# Patient Record
Sex: FEMALE | Race: WHITE | URBAN - METROPOLITAN AREA
[De-identification: names, ages, dates, MRNs, and addresses within clinical notes are randomized per-mention and may not be internally consistent; named-entity substitution may affect disease eponyms.]

---

## 2017-11-16 ENCOUNTER — HOSPITAL ENCOUNTER (EMERGENCY)
Facility: CLINIC | Age: 47
Discharge: HOME | End: 2017-11-16
Attending: EMERGENCY MEDICINE | Admitting: EMERGENCY MEDICINE

## 2017-11-16 VITALS
HEIGHT: 69 IN | BODY MASS INDEX: 25.92 KG/M2 | HEART RATE: 101 BPM | SYSTOLIC BLOOD PRESSURE: 142 MMHG | WEIGHT: 175 LBS | RESPIRATION RATE: 18 BRPM | OXYGEN SATURATION: 98 % | TEMPERATURE: 98.1 F | DIASTOLIC BLOOD PRESSURE: 93 MMHG

## 2017-11-16 DIAGNOSIS — R55 NEAR SYNCOPE: ICD-10-CM

## 2017-11-16 LAB
ALBUMIN SERPL-MCNC: 3.9 G/DL (ref 3.4–5)
ALP SERPL-CCNC: 54 U/L (ref 40–150)
ALT SERPL W P-5'-P-CCNC: 18 U/L (ref 0–50)
ANION GAP SERPL CALCULATED.3IONS-SCNC: 9 MMOL/L (ref 3–14)
AST SERPL W P-5'-P-CCNC: 13 U/L (ref 0–45)
BASOPHILS # BLD AUTO: 0 10E9/L (ref 0–0.2)
BASOPHILS NFR BLD AUTO: 0.3 %
BILIRUB SERPL-MCNC: 0.3 MG/DL (ref 0.2–1.3)
BUN SERPL-MCNC: 11 MG/DL (ref 7–30)
CALCIUM SERPL-MCNC: 8.7 MG/DL (ref 8.5–10.1)
CHLORIDE SERPL-SCNC: 102 MMOL/L (ref 94–109)
CO2 SERPL-SCNC: 26 MMOL/L (ref 20–32)
CREAT SERPL-MCNC: 0.91 MG/DL (ref 0.52–1.04)
DIFFERENTIAL METHOD BLD: NORMAL
EOSINOPHIL # BLD AUTO: 0.1 10E9/L (ref 0–0.7)
EOSINOPHIL NFR BLD AUTO: 2.1 %
ERYTHROCYTE [DISTWIDTH] IN BLOOD BY AUTOMATED COUNT: 12 % (ref 10–15)
GFR SERPL CREATININE-BSD FRML MDRD: 66 ML/MIN/1.7M2
GLUCOSE SERPL-MCNC: 117 MG/DL (ref 70–99)
HCT VFR BLD AUTO: 40.1 % (ref 35–47)
HGB BLD-MCNC: 14.2 G/DL (ref 11.7–15.7)
IMM GRANULOCYTES # BLD: 0 10E9/L (ref 0–0.4)
IMM GRANULOCYTES NFR BLD: 0.2 %
INTERPRETATION ECG - MUSE: NORMAL
LIPASE SERPL-CCNC: 363 U/L (ref 73–393)
LYMPHOCYTES # BLD AUTO: 1.9 10E9/L (ref 0.8–5.3)
LYMPHOCYTES NFR BLD AUTO: 29.2 %
MCH RBC QN AUTO: 32.8 PG (ref 26.5–33)
MCHC RBC AUTO-ENTMCNC: 35.4 G/DL (ref 31.5–36.5)
MCV RBC AUTO: 93 FL (ref 78–100)
MONOCYTES # BLD AUTO: 0.7 10E9/L (ref 0–1.3)
MONOCYTES NFR BLD AUTO: 11.2 %
NEUTROPHILS # BLD AUTO: 3.7 10E9/L (ref 1.6–8.3)
NEUTROPHILS NFR BLD AUTO: 57 %
NRBC # BLD AUTO: 0 10*3/UL
NRBC BLD AUTO-RTO: 0 /100
PLATELET # BLD AUTO: 253 10E9/L (ref 150–450)
POTASSIUM SERPL-SCNC: 3.4 MMOL/L (ref 3.4–5.3)
PROT SERPL-MCNC: 7.7 G/DL (ref 6.8–8.8)
RBC # BLD AUTO: 4.33 10E12/L (ref 3.8–5.2)
SODIUM SERPL-SCNC: 137 MMOL/L (ref 133–144)
TROPONIN I SERPL-MCNC: <0.015 UG/L (ref 0–0.04)
WBC # BLD AUTO: 6.5 10E9/L (ref 4–11)

## 2017-11-16 PROCEDURE — 84484 ASSAY OF TROPONIN QUANT: CPT | Performed by: EMERGENCY MEDICINE

## 2017-11-16 PROCEDURE — 93005 ELECTROCARDIOGRAM TRACING: CPT

## 2017-11-16 PROCEDURE — 99284 EMERGENCY DEPT VISIT MOD MDM: CPT

## 2017-11-16 PROCEDURE — 80053 COMPREHEN METABOLIC PANEL: CPT | Performed by: EMERGENCY MEDICINE

## 2017-11-16 PROCEDURE — 83690 ASSAY OF LIPASE: CPT | Performed by: EMERGENCY MEDICINE

## 2017-11-16 PROCEDURE — 85025 COMPLETE CBC W/AUTO DIFF WBC: CPT | Performed by: EMERGENCY MEDICINE

## 2017-11-16 ASSESSMENT — ENCOUNTER SYMPTOMS
DIZZINESS: 1
BLOOD IN STOOL: 1
NUMBNESS: 1
NAUSEA: 1
LIGHT-HEADEDNESS: 1
FATIGUE: 1
ABDOMINAL PAIN: 1

## 2017-11-16 NOTE — ED PROVIDER NOTES
History     Chief Complaint:  Rectal bleeding    HPI   Katelyn Mauricio is a 47 year old female who presents with rectal bleeding. The patient reports that she had recently broken her wrist and had been taking a large amount of ibuprofen. Two days ago she went to her doctor for abdominal pain and was told that she likely had a gastric ulcer and to watch for bloody stool. She was started on Prilosec.Today the patient has been experienced abdominal pain, left leg numbness, nausea, and light headedness. This light headedness has not been worse with standing and is not associated with any particular activity. Her most concerning symptom today has been dark black and tarry stool. She denies having had any recent dietary changes but does note having started taking Prilosec and pepto bismol. She denies having had any history of bleeding problems or excessive bleeding.    Allergies:  No Known Drug Allergies      Medications:    Omeprazole    Past Medical History:    The patient denies any relevant past medical history.     Past Surgical History:    Appendectomy  ENT surgery    Family History:    The patient denies any relevant family medical history.     Social History:  Smoking Status: No  Smokeless Tobacco: No  Alcohol Use: Yes   Marital Status:   [2]    Review of Systems   Constitutional: Positive for fatigue.   Gastrointestinal: Positive for abdominal pain, blood in stool and nausea.   Neurological: Positive for dizziness, light-headedness and numbness.   All other systems reviewed and are negative.    Physical Exam   Vitals:  Patient Vitals for the past 24 hrs:   BP Temp Temp src Pulse Resp SpO2 Height Weight   11/16/17 0345 (!) 142/93 - - - - 98 % - -   11/16/17 0330 (!) 144/96 - - - - 97 % - -   11/16/17 0320 137/90 - - - - 97 % - -   11/16/17 0246 - - - - - 99 % - -   11/16/17 0245 (!) 154/105 - - - - - - -   11/16/17 0230 (!) 173/107 - - - - 100 % - -   11/16/17 0210 (!) 182/113 98.1  F (36.7  C) Oral 101 18  "99 % 1.753 m (5' 9\") 79.4 kg (175 lb)      Physical Exam  Physical Exam   Nursing note and vitals reviewed.  General: Oriented to person, place, and time. Appears well-developed and well-nourished.   Head: No signs of trauma.   Mouth/Throat: Oropharynx is clear and moist.   Eyes: Conjunctivae are normal. Pupils are equal, round, and reactive to light.   Neck: Normal range of motion. No nuchal rigidity.   Cardiovascular: Normal rate and regular rhythm.    Respiratory: Effort normal and breath sounds normal. No respiratory distress.   Abdominal: Soft. There is no tenderness. There is no guarding.   Musculoskeletal: Normal range of motion. no edema.   Neurological: The patient is alert and oriented to person, place, and time.  PERRLA, EOMI, visual fields intact, strength in upper/lower extremities normal and symmetrical.   Sensation normal. Speech normal  GCS eye subscore is 4. GCS verbal subscore is 5. GCS motor subscore is 6.   Skin: Skin is warm and dry. No rash noted.   Psychiatric: normal mood and affect. behavior is normal.    Emergency Department Course     ECG:  ECG taken at 0315, ECG read at 0317  Normal sinus rhythm. possible left atrial enlargement. Borderline ECG. No significant change noted compared to 12/10/07  Rate 80 bpm. AL interval 132. QRS duration 86. QT/QTc 398/459. P-R-T axes 73, 57, 47.     Laboratory:  Laboratory findings were communicated with the patient who voiced understanding of the findings.  CBC: AWNL (WBC 6.5, HGB 14.2, )  CMP: Glucose: 117(H), o/w WNL (Creatinine 0.91)   Lipase: 363  Troponin (Collected 0240): <0.015     Emergency Department Course:  Nursing notes and vitals reviewed.  I performed an exam of the patient as documented above.   IV was inserted and blood was drawn for laboratory testing, results above.    0342 I rechecked with the patient    I discussed the treatment plan with the patient. They expressed understanding of this plan and consented to discharge. They " will be discharged home with instructions for care and follow up. In addition, the patient will return to the emergency department if their symptoms persist, worsen, if new symptoms arise or if there is any concern.  All questions were answered.     I personally reviewed the laboratory results with the patient and answered all related questions prior to discharge.    Impression & Plan      Medical Decision Making:  Katelyn Mauricio is a 47 year old female who presents to the emergency department today with symptoms consistent with near syncope. This is in the setting of epigastric pain and possible melena tonight, however she has been using pepto bismol which is also a likely cause of her dark stools. The patient is unable to give us a sample and is refusing rectal exam to obtain the sample to test stool. Patient's hemoglobin is adequate, platelets are appropriate, and her white count is normal. No liver inflammation, no signs of pancreatitis, no indication of acute coronary syndrome, no signs of a cardiac arrhythmia, and no signs of systemic infection. The patient remained hemodynamically stable here in the emergency department and is asymptomatic. I think it is safe for her to follow up as an outpatient for further evaluation and treatment.     Diagnosis:    ICD-10-CM    1. Near syncope R55        Disposition:   Discharged    CMS Diagnoses: None     Scribe Disclosure:  Mitchell ACEVES, am serving as a scribe at 2:51 AM on 11/16/2017 to document services personally performed by Aadm Alcocer MD, based on my observations and the provider's statements to me.    EMERGENCY DEPARTMENT       Adam Alcocer MD  11/16/17 0633

## 2017-11-16 NOTE — ED AVS SNAPSHOT
Emergency Department    [address] [facility] [place] [address]    Phone:  [phone]    Fax:  [phone]                                       Katelyn Mauricio   MRN: [id number]    Department:   Emergency Department   Date of Visit:  11/16/2017           Patient Information     Date Of Birth          1970        Your diagnoses for this visit were:     Near syncope        You were seen by Adam Alcocer MD.      Follow-up Information     Follow up with  Emergency Department In 1 day.    Specialty:  EMERGENCY MEDICINE    Why:  If symptoms worsen    Contact information:    [address] [facility] [id number] [phone]        Follow up with No Ref-Primary, Physician. Call in 2 days.    Contact information:    NO REF-PRIMARY PHYSICIAN          Discharge Instructions         Near-Fainting: Uncertain Cause  Fainting (syncope) is a temporary loss of consciousness (passing out). This happens when blood flow to the brain is reduced. Near-fainting (near-syncope) is like fainting, but you do not fully pass out. Instead, you feel like you are going to pass out, but do not actually lose consciousness.  Signs and symptoms  The following are symptoms of near-fainting:    Feeling lightheaded or like you are going to faint    Weak pulse    Nausea    Sweating    Blurred vision or feeling like your vision is fading    Palpitations    Chest pain    Hard time breathing    Feeling cool and clammy  Causes  This happens when your blood pressure suddenly drops, and not enough blood flows to your brain.  Common minor causes include:    Sudden emotional stress such as fear, pain, panic, or the sight of blood    Straining or overexertion, such as straining while using the toilet, coughing, or sneezing    Standing up too quickly, or standing up for too long a time  More serious causes include:    Very slow, fast, or irregular heart rate (arrhythmia)    Dehydration    Significant blood  loss    Medicines, or a recent change in medicines. Medicines that can cause fainting include blood pressure or heart medicines.    Heart attack    Heart valve problems  Remember, even minor causes can become serious if you fall and injure yourself, or are driving. You may need more tests. It is very important that you follow up with your doctor as advised.  Home care  The following guidelines will help you care for yourself at home:    Rest today. Resume your normal activities as soon as you are feeling back to normal.    If you become lightheaded or dizzy, lie down right away or sit with your head between your knees.    Drink plenty of fluids and don't skip meals.  Because the exact cause of your near fainting spell is not known, another spell could occur without warning. To stay safe, do not drive a car or use dangerous equipment. Do not take a bath alone. Use a shower instead. Do not swim alone. You can resume these activities when your healthcare provider says that you are no longer in danger of having a near-fainting spell.  Follow-up care  Follow up with your healthcare provider, or as advised.  Call 911  Call 911 if any of the following occur:    Another near-fainting or full fainting spell occurs, and it is not explained by the common causes listed above    Chest, arm, neck, jaw, back or abdominal pain    Shortness of breath    Weakness, tingling, or numbness in one side of the face, or in one arm or leg    Slurred speech, confusion, trouble walking or seeing    Seizure  When to seek medical advice  Call your healthcare provider right away if any of these occur:    Changes in your medicines    Occasional mild lightheadedness, especially when standing up too quickly or straining  Date Last Reviewed: 10/1/2016    3044-7176 [employer]. [address] [facility], [place] [address]. All rights reserved. This information is not intended as a substitute for professional medical care. Always follow your  healthcare professional's instructions.          24 Hour Appointment Hotline       To make an appointment at any [facility], call [phone] ([phone]). If you don't have a family doctor or clinic, we will help you find one. [facility] are conveniently located to serve the needs of you and your family.             Review of your medicines      Our records show that you are taking the medicines listed below. If these are incorrect, please call your family doctor or clinic.        Dose / Directions Last dose taken    PRILOSEC PO        Refills:  0                Procedures and tests performed during your visit     CBC with platelets + differential    Comprehensive metabolic panel    EKG 12 lead    Lipase    Troponin I      Orders Needing Specimen Collection     Ordered          11/16/17 0251  Occult blood stool - STAT, Prio: STAT, Needs to be Collected     Scheduled Task Status   11/16/17 0251 Collect Occult blood stool Open   Order Class:  PCU Collect                  Pending Results     No orders found from 11/14/2017 to 11/17/2017.            Pending Culture Results     No orders found from 11/14/2017 to 11/17/2017.            Pending Results Instructions     If you had any lab results that were not finalized at the time of your Discharge, you can call the ED Lab Result RN at [phone]. You will be contacted by this team for any positive Lab results or changes in treatment. The nurses are available 7 days a week from 10A to 6:30P.  You can leave a message 24 hours per day and they will return your call.        Test Results From Your Hospital Stay        11/16/2017  2:57 AM      Component Results     Component Value Ref Range & Units Status    WBC 6.5 4.0 - 11.0 10e9/L Final    RBC Count 4.33 3.8 - 5.2 10e12/L Final    Hemoglobin 14.2 11.7 - 15.7 g/dL Final    Hematocrit 40.1 35.0 - 47.0 % Final    MCV 93 78 - 100 fl Final    MCH 32.8 26.5 - 33.0 pg Final    MCHC 35.4 31.5 - 36.5 g/dL Final     RDW 12.0 10.0 - 15.0 % Final    Platelet Count 253 150 - 450 10e9/L Final    Diff Method Automated Method  Final    % Neutrophils 57.0 % Final    % Lymphocytes 29.2 % Final    % Monocytes 11.2 % Final    % Eosinophils 2.1 % Final    % Basophils 0.3 % Final    % Immature Granulocytes 0.2 % Final    Nucleated RBCs 0 0 /100 Final    Absolute Neutrophil 3.7 1.6 - 8.3 10e9/L Final    Absolute Lymphocytes 1.9 0.8 - 5.3 10e9/L Final    Absolute Monocytes 0.7 0.0 - 1.3 10e9/L Final    Absolute Eosinophils 0.1 0.0 - 0.7 10e9/L Final    Absolute Basophils 0.0 0.0 - 0.2 10e9/L Final    Abs Immature Granulocytes 0.0 0 - 0.4 10e9/L Final    Absolute Nucleated RBC 0.0  Final         11/16/2017  3:18 AM      Component Results     Component Value Ref Range & Units Status    Sodium 137 133 - 144 mmol/L Final    Potassium 3.4 3.4 - 5.3 mmol/L Final    Chloride 102 94 - 109 mmol/L Final    Carbon Dioxide 26 20 - 32 mmol/L Final    Anion Gap 9 3 - 14 mmol/L Final    Glucose 117 (H) 70 - 99 mg/dL Final    Urea Nitrogen 11 7 - 30 mg/dL Final    Creatinine 0.91 0.52 - 1.04 mg/dL Final    GFR Estimate 66 >60 mL/min/1.7m2 Final    Non  GFR Calc    GFR Estimate If Black 80 >60 mL/min/1.7m2 Final    African American GFR Calc    Calcium 8.7 8.5 - 10.1 mg/dL Final    Bilirubin Total 0.3 0.2 - 1.3 mg/dL Final    Albumin 3.9 3.4 - 5.0 g/dL Final    Protein Total 7.7 6.8 - 8.8 g/dL Final    Alkaline Phosphatase 54 40 - 150 U/L Final    ALT 18 0 - 50 U/L Final    AST 13 0 - 45 U/L Final         11/16/2017  3:18 AM      Component Results     Component Value Ref Range & Units Status    Lipase 363 73 - 393 U/L Final         11/16/2017  3:26 AM      Component Results     Component Value Ref Range & Units Status    Troponin I ES <0.015 0.000 - 0.045 ug/L Final    The 99th percentile for upper reference range is 0.045 ug/L.  Troponin values   in the range of 0.045 - 0.120 ug/L may be associated with risks of adverse   clinical  events.                  Clinical Quality Measure: Blood Pressure Screening     Your blood pressure was checked while you were in the emergency department today. The last reading we obtained was  BP: (!) 144/96 . Please read the guidelines below about what these numbers mean and what you should do about them.  If your systolic blood pressure (the top number) is less than 120 and your diastolic blood pressure (the bottom number) is less than 80, then your blood pressure is normal. There is nothing more that you need to do about it.  If your systolic blood pressure (the top number) is 120-139 or your diastolic blood pressure (the bottom number) is 80-89, your blood pressure may be higher than it should be. You should have your blood pressure rechecked within a year by a primary care provider.  If your systolic blood pressure (the top number) is 140 or greater or your diastolic blood pressure (the bottom number) is 90 or greater, you may have high blood pressure. High blood pressure is treatable, but if left untreated over time it can put you at risk for heart attack, stroke, or kidney failure. You should have your blood pressure rechecked by a primary care provider within the next 4 weeks.  If your provider in the emergency department today gave you specific instructions to follow-up with your doctor or provider even sooner than that, you should follow that instruction and not wait for up to 4 weeks for your follow-up visit.        Thank you for choosing [facility]       Thank you for choosing [facility] for your care. Our goal is always to provide you with excellent care. Hearing back from our patients is one way we can continue to improve our services. Please take a few minutes to complete the written survey that you may receive in the mail after you visit with us. Thank you!        [employer] Information     [employer] lets you send messages to your doctor, view your test results, renew your prescriptions, schedule  "appointments and more. To sign up, go to [email or web address] . Click on \"Log in\" on the left side of the screen, which will take you to the Welcome page. Then click on \"Sign up Now\" on the right side of the page.     You will be asked to enter the access code listed below, as well as some personal information. Please follow the directions to create your username and password.     Your access code is: [email or web address]  Expires: 2018  3:54 AM     Your access code will  in 90 days. If you need help or a new code, please call your [facility] clinic or [phone].        Care EveryWhere ID     This is your Care EveryWhere ID. This could be used by other organizations to access your [facility] medical records  [id number]        Equal Access to Services     DEBBY LOFTON : Samira Fuentes, florencio farrell, denny robbins, joselito smith . So [facility] [phone].    ATENCIÓN: Si habla español, tiene a castellon disposición servicios gratuitos de asistencia lingüística. Llame al [phone].    We comply with applicable federal civil rights laws and [place] laws. We do not discriminate on the basis of race, color, national origin, age, disability, sex, sexual orientation, or gender identity.            After Visit Summary       This is your record. Keep this with you and show to your community pharmacist(s) and doctor(s) at your next visit.                  "

## 2017-11-16 NOTE — DISCHARGE INSTRUCTIONS
Near-Fainting: Uncertain Cause  Fainting (syncope) is a temporary loss of consciousness (passing out). This happens when blood flow to the brain is reduced. Near-fainting (near-syncope) is like fainting, but you do not fully pass out. Instead, you feel like you are going to pass out, but do not actually lose consciousness.  Signs and symptoms  The following are symptoms of near-fainting:    Feeling lightheaded or like you are going to faint    Weak pulse    Nausea    Sweating    Blurred vision or feeling like your vision is fading    Palpitations    Chest pain    Hard time breathing    Feeling cool and clammy  Causes  This happens when your blood pressure suddenly drops, and not enough blood flows to your brain.  Common minor causes include:    Sudden emotional stress such as fear, pain, panic, or the sight of blood    Straining or overexertion, such as straining while using the toilet, coughing, or sneezing    Standing up too quickly, or standing up for too long a time  More serious causes include:    Very slow, fast, or irregular heart rate (arrhythmia)    Dehydration    Significant blood loss    Medicines, or a recent change in medicines. Medicines that can cause fainting include blood pressure or heart medicines.    Heart attack    Heart valve problems  Remember, even minor causes can become serious if you fall and injure yourself, or are driving. You may need more tests. It is very important that you follow up with your doctor as advised.  Home care  The following guidelines will help you care for yourself at home:    Rest today. Resume your normal activities as soon as you are feeling back to normal.    If you become lightheaded or dizzy, lie down right away or sit with your head between your knees.    Drink plenty of fluids and don't skip meals.  Because the exact cause of your near fainting spell is not known, another spell could occur without warning. To stay safe, do not drive a car or use dangerous  equipment. Do not take a bath alone. Use a shower instead. Do not swim alone. You can resume these activities when your healthcare provider says that you are no longer in danger of having a near-fainting spell.  Follow-up care  Follow up with your healthcare provider, or as advised.  Call 911  Call 911 if any of the following occur:    Another near-fainting or full fainting spell occurs, and it is not explained by the common causes listed above    Chest, arm, neck, jaw, back or abdominal pain    Shortness of breath    Weakness, tingling, or numbness in one side of the face, or in one arm or leg    Slurred speech, confusion, trouble walking or seeing    Seizure  When to seek medical advice  Call your healthcare provider right away if any of these occur:    Changes in your medicines    Occasional mild lightheadedness, especially when standing up too quickly or straining  Date Last Reviewed: 10/1/2016    7326-0789 [employer]. [address] [place] [address], [place] [address]. All rights reserved. This information is not intended as a substitute for professional medical care. Always follow your healthcare professional's instructions.

## 2017-11-16 NOTE — ED AVS SNAPSHOT
Emergency Department    [address] [place] [address]    Phone:  [phone]    Fax:  [phone]                                       Katelyn Mauricio   MRN: [id number]    Department:   Emergency Department   Date of Visit:  11/16/2017           After Visit Summary Signature Page     I have received my discharge instructions, and my questions have been answered. I have discussed any challenges I see with this plan with the nurse or doctor.    ..........................................................................................................................................  Patient/Patient Representative Signature      ..........................................................................................................................................  Patient Representative Print Name and Relationship to Patient    ..................................................               ................................................  Date                                            Time    ..........................................................................................................................................  Reviewed by Signature/Title    ...................................................              ..............................................  Date                                                            Time

## 2017-11-21 ENCOUNTER — APPOINTMENT (OUTPATIENT)
Dept: GENERAL RADIOLOGY | Facility: CLINIC | Age: 47
End: 2017-11-21
Attending: EMERGENCY MEDICINE
Payer: COMMERCIAL

## 2017-11-21 ENCOUNTER — HOSPITAL ENCOUNTER (EMERGENCY)
Facility: CLINIC | Age: 47
Discharge: HOME | End: 2017-11-21
Attending: EMERGENCY MEDICINE | Admitting: EMERGENCY MEDICINE
Payer: COMMERCIAL

## 2017-11-21 VITALS
OXYGEN SATURATION: 99 % | TEMPERATURE: 97.9 F | WEIGHT: 167 LBS | HEIGHT: 69 IN | RESPIRATION RATE: 18 BRPM | BODY MASS INDEX: 24.73 KG/M2 | SYSTOLIC BLOOD PRESSURE: 148 MMHG | HEART RATE: 89 BPM | DIASTOLIC BLOOD PRESSURE: 90 MMHG

## 2017-11-21 DIAGNOSIS — R10.13 ABDOMINAL PAIN, EPIGASTRIC: ICD-10-CM

## 2017-11-21 DIAGNOSIS — K92.1 BLACK STOOLS: ICD-10-CM

## 2017-11-21 LAB
ALBUMIN SERPL-MCNC: 3.7 G/DL (ref 3.4–5)
ALP SERPL-CCNC: 47 U/L (ref 40–150)
ALT SERPL W P-5'-P-CCNC: 23 U/L (ref 0–50)
ANION GAP SERPL CALCULATED.3IONS-SCNC: 5 MMOL/L (ref 3–14)
AST SERPL W P-5'-P-CCNC: 15 U/L (ref 0–45)
BASOPHILS # BLD AUTO: 0 10E9/L (ref 0–0.2)
BASOPHILS NFR BLD AUTO: 0.3 %
BILIRUB SERPL-MCNC: 0.3 MG/DL (ref 0.2–1.3)
BUN SERPL-MCNC: 6 MG/DL (ref 7–30)
CALCIUM SERPL-MCNC: 8.6 MG/DL (ref 8.5–10.1)
CHLORIDE SERPL-SCNC: 105 MMOL/L (ref 94–109)
CO2 SERPL-SCNC: 27 MMOL/L (ref 20–32)
CREAT SERPL-MCNC: 0.74 MG/DL (ref 0.52–1.04)
DIFFERENTIAL METHOD BLD: NORMAL
EOSINOPHIL # BLD AUTO: 0.1 10E9/L (ref 0–0.7)
EOSINOPHIL NFR BLD AUTO: 1.9 %
ERYTHROCYTE [DISTWIDTH] IN BLOOD BY AUTOMATED COUNT: 12.1 % (ref 10–15)
GFR SERPL CREATININE-BSD FRML MDRD: 84 ML/MIN/1.7M2
GLUCOSE SERPL-MCNC: 126 MG/DL (ref 70–99)
HCT VFR BLD AUTO: 37 % (ref 35–47)
HGB BLD-MCNC: 12.8 G/DL (ref 11.7–15.7)
IMM GRANULOCYTES # BLD: 0 10E9/L (ref 0–0.4)
IMM GRANULOCYTES NFR BLD: 0.1 %
LIPASE SERPL-CCNC: 266 U/L (ref 73–393)
LYMPHOCYTES # BLD AUTO: 2.1 10E9/L (ref 0.8–5.3)
LYMPHOCYTES NFR BLD AUTO: 28 %
MCH RBC QN AUTO: 32.2 PG (ref 26.5–33)
MCHC RBC AUTO-ENTMCNC: 34.6 G/DL (ref 31.5–36.5)
MCV RBC AUTO: 93 FL (ref 78–100)
MONOCYTES # BLD AUTO: 0.6 10E9/L (ref 0–1.3)
MONOCYTES NFR BLD AUTO: 8.4 %
NEUTROPHILS # BLD AUTO: 4.6 10E9/L (ref 1.6–8.3)
NEUTROPHILS NFR BLD AUTO: 61.3 %
NRBC # BLD AUTO: 0 10*3/UL
NRBC BLD AUTO-RTO: 0 /100
PLATELET # BLD AUTO: 248 10E9/L (ref 150–450)
POTASSIUM SERPL-SCNC: 3.8 MMOL/L (ref 3.4–5.3)
PROT SERPL-MCNC: 6.8 G/DL (ref 6.8–8.8)
RBC # BLD AUTO: 3.98 10E12/L (ref 3.8–5.2)
SODIUM SERPL-SCNC: 137 MMOL/L (ref 133–144)
WBC # BLD AUTO: 7.5 10E9/L (ref 4–11)

## 2017-11-21 PROCEDURE — 96360 HYDRATION IV INFUSION INIT: CPT

## 2017-11-21 PROCEDURE — 25000128 H RX IP 250 OP 636: Performed by: EMERGENCY MEDICINE

## 2017-11-21 PROCEDURE — 74020 XR ABDOMEN 2 VW: CPT

## 2017-11-21 PROCEDURE — 80053 COMPREHEN METABOLIC PANEL: CPT | Performed by: EMERGENCY MEDICINE

## 2017-11-21 PROCEDURE — 83690 ASSAY OF LIPASE: CPT | Performed by: EMERGENCY MEDICINE

## 2017-11-21 PROCEDURE — 85025 COMPLETE CBC W/AUTO DIFF WBC: CPT | Performed by: EMERGENCY MEDICINE

## 2017-11-21 PROCEDURE — 99284 EMERGENCY DEPT VISIT MOD MDM: CPT | Mod: 25

## 2017-11-21 RX ORDER — SUCRALFATE 1 G/1
1 TABLET ORAL 4 TIMES DAILY
Qty: 120 TABLET | Refills: 0 | Status: SHIPPED | OUTPATIENT
Start: 2017-11-21

## 2017-11-21 RX ORDER — PANTOPRAZOLE SODIUM 40 MG/1
40 TABLET, DELAYED RELEASE ORAL DAILY
Qty: 30 TABLET | Refills: 1 | Status: SHIPPED | OUTPATIENT
Start: 2017-11-21

## 2017-11-21 RX ORDER — SODIUM CHLORIDE 9 MG/ML
1000 INJECTION, SOLUTION INTRAVENOUS CONTINUOUS
Status: DISCONTINUED | OUTPATIENT
Start: 2017-11-21 | End: 2017-11-21 | Stop reason: HOSPADM

## 2017-11-21 RX ADMIN — SODIUM CHLORIDE 1000 ML: 9 INJECTION, SOLUTION INTRAVENOUS at 15:19

## 2017-11-21 ASSESSMENT — ENCOUNTER SYMPTOMS
ABDOMINAL PAIN: 1
LIGHT-HEADEDNESS: 1
BLOOD IN STOOL: 1
NAUSEA: 1
FEVER: 0

## 2017-11-21 NOTE — ED AVS SNAPSHOT
Emergency Department    [address] [facility] [address]    Phone:  [phone]    Fax:  [phone]                                       Katelyn Mauricio   MRN: [id number]    Department:   Emergency Department   Date of Visit:  11/21/2017           Patient Information     Date Of Birth          1970        Your diagnoses for this visit were:     Abdominal pain, epigastric     Black stools        You were seen by Mildred Guerin MD.      Follow-up Information     Follow up with Clinic, [facility]. Schedule an appointment as soon as possible for a visit in 3 days.    Contact information:    [address], [address]  [facility] [address]  [phone]          Discharge Instructions       *Get plenty of rest and avoid strenuous activities.  *Take medications as prescribed.  Avoid NSAIDs. Zofran for nausea.  Carafate and omeprazole.   *Follow-up with your doctor for a recheck within 12-36 hours.  Follow-up for endoscopy - a referral has been made for you and you should receive a call.  *Return to the ER if you develop fever, worsening pain, pain that moves to the right lower abdomen, faint or feel like you will faint or become worse in any way.      Gastritis or Ulcer (No Antibiotic Treatment)    Gastritis is irritation and inflammation of the stomach lining. This means the lining is red and swollen. An ulcer is an open sore in the lining of the stomach. It may also occur in the duodenum (first part of the small intestine). The causes and symptoms of gastritis and ulcers are very similar.  Causes and risk factors for both problems can include:    Long-term use of nonsteroidal anti-inflammatory drugs (NSAIDs), such as aspirin and ibuprofen    H. pylori bacteria infection    Tobacco use    Alcohol use  Symptoms for both problems can include:    Dull or burning pain in the upper part of the belly    Loss of appetite    Heartburn or upset stomach    Frequent burping    Bloated  feeling    Nausea with or without vomiting  You likely had an evaluation to help determine the exact cause and extent of your problem. This may have included a health history, exam, and certain tests.  Results showed that your problem is not due to H. pylori infection. For this reason, no antibiotics are needed as part of your treatment.  Whether your problem is found to be gastritis or an ulcer, you will still need to take other medicines, however. You will also need to follow instructions to help reduce stomach irritation so your stomach can heal.   Home care    Take any medicines you re prescribed exactly as directed. Common medicines used to treat gastritis include:    Antacids. These help neutralize the normal acids in your stomach.    Proton pump inhibitors. These block your stomach from making any acid.    H2 blockers.These reduce the amount of acid your stomach makes.    Bismuth subsalicylate. This helps protect the lining of your stomach from acid.    Avoid taking any NSAIDS during your treatment. If you take NSAID to help treat other health problems, tell your healthcare provider. He or she may need to adjust your medicine plan or change the dosage.    Don t use tobacco. Also don t drink alcohol. These products can increase the amount of acid your stomach makes. This can delay healing. It can also worsen symptoms.  Follow-up care  Follow up with your healthcare provider, or as advised. In some cases, further testing may be needed.  When to seek medical advice  Call your healthcare provider right away if any of these occur:    Fever of 100.4 F (38 C) or higher or as directed by your provider    Stomach pain that worsens or moves to the lower right part of abdomen    Extreme fatigue    Weakness or dizziness    Continued weight loss    Frequent vomiting, blood in your vomit, or coffee ground-like substance in your vomit    Black, tarry, or bloody stools  Call 911  Call 911 right away if any of these  occur:    Chest pain appears or worsens, or spreads to the back, neck, shoulder, or arm    Unusually fast heart rate    Trouble breathing or swallowing    Confusion    Extreme drowsiness or trouble waking up    Fainting    Large amounts of blood present in vomit or stool  Date Last Reviewed: 6/16/2015 2000-2017 [employer]. [address] [place] [id number]. All rights reserved. This information is not intended as a substitute for professional medical care. Always follow your healthcare professional's instructions.        Discharge Instructions  Gastrointestinal Bleeding Not Requiring Admission    You have been seen today because of GI (gastrointestinal) bleeding, bleeding somewhere along your digestive tract.  Most common symptoms are blood in the stool or when you have a bowel movement.  The most common causes of minor GI bleeding are ulcers and hemorrhoids.  Other conditions that cause bleeding include abnormal blood vessels in your GI tract, diverticulosis, inflammatory bowel disease, and cancer.  Fortunately, most of the causes of such bleeding are not life-threatening.      It does not appear that your bleeding is serious enough to require admission to the hospital, but it is very important for you to follow up as instructed.    At your follow up, your regular doctor or GI specialist may order further testing such as:    Blood and stool tests.    Endoscopy and/or colonoscopy, where a tube with a camera is used to look at your digestive tract.    Other very specialized tests depending on your symptoms.    Return to the Emergency Department right away if you:    Develop fever with an oral temperature above 101 F.    Vomit blood or something that looks like coffee grounds.    Have a bowel movement that looks like tar or has a large amount of blood in it.    Feel weak, light-headed, or faint.    Have a racing heartbeat.    Have abdominal pain that is new or increasing.    Have new symptoms  that worry you.    What can I do to help myself?    Take any acid reducing medication prescribed by your doctor.    Avoid over the counter medications such as aspirin and Advil , Nuprin  (ibuprofen) that can thin your blood or irritate your stomach, making ulcers worse.  If you were given a prescription for medicine here today, be sure to read all of the information (including the package insert) that comes with your prescription.  This will include important information about the medicine, its side effects, and any warnings that you need to know about.  The pharmacist who fills the prescription can provide more information and answer questions you may have about the medicine.  If you have questions or concerns that the pharmacist cannot address, please call or return to the Emergency Department.     Opioid Medication Information    Pain medications are among the most commonly prescribed medicines, so we are including this information for all our patients. If you did not receive pain medication or get a prescription for pain medicine, you can ignore it.     You may have been given a prescription for an opioid (narcotic) pain medicine and/or have received a pain medicine while here in the Emergency Department. These medicines can make you drowsy or impaired. You must not drive, operate dangerous equipment, or engage in any other dangerous activities while taking these medications. If you drive while taking these medications, you could be arrested for DUI, or driving under the influence. Do not drink any alcohol while you are taking these medications.     Opioid pain medications can cause addiction. If you have a history of chemical dependency of any type, you are at a higher risk of becoming addicted to pain medications.  Only take these prescribed medications to treat your pain when all other options have been tried. Take it for as short a time and as few doses as possible. Store your pain pills in a secure place,  as they are frequently stolen and provide a dangerous opportunity for children or visitors in your house to start abusing these powerful medications. We will not replace any lost or stolen medicine.  As soon as your pain is better, you should flush all your remaining medication.     Many prescription pain medications contain Tylenol  (acetaminophen), including Vicodin , Tylenol #3 , Norco , Lortab , and Percocet .  You should not take any extra pills of Tylenol  if you are using these prescription medications or you can get very sick.  Do not ever take more than 3000 mg of acetaminophen in any 24 hour period.    All opioids tend to cause constipation. Drink plenty of water and eat foods that have a lot of fiber, such as fruits, vegetables, prune juice, apple juice and high fiber cereal.  Take a laxative if you don t move your bowels at least every other day. Miralax , Milk of Magnesia, Colace , or Senna  can be used to keep you regular.      Remember that you can always come back to the Emergency Department if you are not able to see your regular doctor in the amount of time listed above, if you get any new symptoms, or if there is anything that worries you.      24 Hour Appointment Hotline       To make an appointment at any [facility] clinic, call [phone] ([phone]). If you don't have a family doctor or clinic, we will help you find one. [facility] clinics are conveniently located to serve the needs of you and your family.          ED Discharge Orders     GASTROENTEROLOGY ADULT REF PROCEDURE ONLY       Last Lab Result: Creatinine (mg/dL)       Date                     Value                 11/21/2017               0.74             ----------  Body mass index is 24.66 kg/(m^2).     Needed:  No  Language:  English    Patient will be contacted to schedule procedure.     Please be aware that coverage of these services is subject to the terms and limitations of your health insurance plan.  Call  member services at your health plan with any benefit or coverage questions.  Any procedures must be performed at a [facility] facility OR coordinated by your clinic's referral office.    Please bring the following with you to your appointment:    (1) Any X-Rays, CTs or MRIs which have been performed.  Contact the facility where they were done to arrange for  prior to your scheduled appointment.    (2) List of current medications   (3) This referral request   (4) Any documents/labs given to you for this referral                     Review of your medicines      START taking        Dose / Directions Last dose taken    pantoprazole 40 MG EC tablet   Commonly known as:  PROTONIX   Dose:  40 mg   Quantity:  30 tablet        Take 1 tablet (40 mg) by mouth daily Take 30-60 minutes before a meal.   Refills:  1        sucralfate 1 GM tablet   Commonly known as:  CARAFATE   Dose:  1 g   Quantity:  120 tablet        Take 1 tablet (1 g) by mouth 4 times daily   Refills:  0          Our records show that you are taking the medicines listed below. If these are incorrect, please call your family doctor or clinic.        Dose / Directions Last dose taken    PRILOSEC PO        Refills:  0                Prescriptions were sent or printed at these locations (2 Prescriptions)                   Other Prescriptions                Printed at Department/Unit printer (2 of 2)         pantoprazole (PROTONIX) 40 MG EC tablet               sucralfate (CARAFATE) 1 GM tablet                Procedures and tests performed during your visit     CBC with platelets differential    Comprehensive metabolic panel    Lipase    XR Abdomen 2 Views      Orders Needing Specimen Collection     Ordered          11/21/17 1457  Occult blood stool - STAT, [facility]: STAT, Needs to be Collected     Scheduled Task Status   11/21/17 1458 Collect Occult blood stool Open   Order Class:  PCU Collect                  Pending Results     No orders found from 11/19/2017  to 11/22/2017.            Pending Culture Results     No orders found from 11/19/2017 to 11/22/2017.            Pending Results Instructions     If you had any lab results that were not finalized at the time of your Discharge, you can call the ED Lab Result RN at [phone]. You will be contacted by this team for any positive Lab results or changes in treatment. The nurses are available 7 days a week from 10A to 6:30P.  You can leave a message 24 hours per day and they will return your call.        Test Results From Your Hospital Stay        11/21/2017  3:27 PM      Component Results     Component Value Ref Range & Units Status    WBC 7.5 4.0 - 11.0 10e9/L Final    RBC Count 3.98 3.8 - 5.2 10e12/L Final    Hemoglobin 12.8 11.7 - 15.7 g/dL Final    Hematocrit 37.0 35.0 - 47.0 % Final    MCV 93 78 - 100 fl Final    MCH 32.2 26.5 - 33.0 pg Final    MCHC 34.6 31.5 - 36.5 g/dL Final    RDW 12.1 10.0 - 15.0 % Final    Platelet Count 248 150 - 450 10e9/L Final    Diff Method Automated Method  Final    % Neutrophils 61.3 % Final    % Lymphocytes 28.0 % Final    % Monocytes 8.4 % Final    % Eosinophils 1.9 % Final    % Basophils 0.3 % Final    % Immature Granulocytes 0.1 % Final    Nucleated RBCs 0 0 /100 Final    Absolute Neutrophil 4.6 1.6 - 8.3 10e9/L Final    Absolute Lymphocytes 2.1 0.8 - 5.3 10e9/L Final    Absolute Monocytes 0.6 0.0 - 1.3 10e9/L Final    Absolute Eosinophils 0.1 0.0 - 0.7 10e9/L Final    Absolute Basophils 0.0 0.0 - 0.2 10e9/L Final    Abs Immature Granulocytes 0.0 0 - 0.4 10e9/L Final    Absolute Nucleated RBC 0.0  Final         11/21/2017  3:49 PM      Component Results     Component Value Ref Range & Units Status    Sodium 137 133 - 144 mmol/L Final    Potassium 3.8 3.4 - 5.3 mmol/L Final    Chloride 105 94 - 109 mmol/L Final    Carbon Dioxide 27 20 - 32 mmol/L Final    Anion Gap 5 3 - 14 mmol/L Final    Glucose 126 (H) 70 - 99 mg/dL Final    Urea Nitrogen 6 (L) 7 - 30 mg/dL Final    Creatinine  0.74 0.52 - 1.04 mg/dL Final    GFR Estimate 84 >60 mL/min/1.7m2 Final    Non  GFR Calc    GFR Estimate If Black >90 >60 mL/min/1.7m2 Final    African American GFR Calc    Calcium 8.6 8.5 - 10.1 mg/dL Final    Bilirubin Total 0.3 0.2 - 1.3 mg/dL Final    Albumin 3.7 3.4 - 5.0 g/dL Final    Protein Total 6.8 6.8 - 8.8 g/dL Final    Alkaline Phosphatase 47 40 - 150 U/L Final    ALT 23 0 - 50 U/L Final    AST 15 0 - 45 U/L Final         11/21/2017  3:47 PM      Component Results     Component Value Ref Range & Units Status    Lipase 266 73 - 393 U/L Final         11/21/2017  3:58 PM      Narrative     ABDOMEN TWO VIEWS 11/21/2017 3:36 PM     HISTORY: Epigastric abdominal pain, black stools.          Impression     IMPRESSION: Flat and upright views of the abdomen. Bowel gas pattern  is unremarkable. No dilated loops of bowel are present to suggest  obstruction. No obvious constipation. Lung bases are clear where  imaged. No free intraperitoneal air. No abnormal calcifications are  evident. IUD projects over the central pelvis.    KIAN LEE MD                Clinical Quality Measure: Blood Pressure Screening     Your blood pressure was checked while you were in the emergency department today. The last reading we obtained was  BP: 148/90 . Please read the guidelines below about what these numbers mean and what you should do about them.  If your systolic blood pressure (the top number) is less than 120 and your diastolic blood pressure (the bottom number) is less than 80, then your blood pressure is normal. There is nothing more that you need to do about it.  If your systolic blood pressure (the top number) is 120-139 or your diastolic blood pressure (the bottom number) is 80-89, your blood pressure may be higher than it should be. You should have your blood pressure rechecked within a year by a primary care provider.  If your systolic blood pressure (the top number) is 140 or greater or your diastolic  "blood pressure (the bottom number) is 90 or greater, you may have high blood pressure. High blood pressure is treatable, but if left untreated over time it can put you at risk for heart attack, stroke, or kidney failure. You should have your blood pressure rechecked by a primary care provider within the next 4 weeks.  If your provider in the emergency department today gave you specific instructions to follow-up with your doctor or provider even sooner than that, you should follow that instruction and not wait for up to 4 weeks for your follow-up visit.        Thank you for choosing [facility]       Thank you for choosing [facility] for your care. Our goal is always to provide you with excellent care. Hearing back from our patients is one way we can continue to improve our services. Please take a few minutes to complete the written survey that you may receive in the mail after you visit with us. Thank you!        [facility] Information     [facility] lets you send messages to your doctor, view your test results, renew your prescriptions, schedule appointments and more. To sign up, go to [email or web address] . Click on \"Log in\" on the left side of the screen, which will take you to the Welcome page. Then click on \"Sign up Now\" on the right side of the page.     You will be asked to enter the access code listed below, as well as some personal information. Please follow the directions to create your username and password.     Your access code is: [email or web address]  Expires: 2018  3:54 AM     Your access code will  in 90 days. If you need help or a new code, please call your [facility] clinic or [phone].        Care EveryWhere ID     This is your Care EveryWhere ID. This could be used by other organizations to access your [facility] medical records  [id number]        Equal Access to Services     DEBBY LOFTON AH: florencio Bustillos, joselito vasquez " la'agueda jeannette. So [facility] [phone].    ATENCIÓN: Si habla español, tiene a castellon disposición servicios gratuitos de asistencia lingüística. Llame al [phone].    We comply with applicable federal civil rights laws and [place] laws. We do not discriminate on the basis of race, color, national origin, age, disability, sex, sexual orientation, or gender identity.            After Visit Summary       This is your record. Keep this with you and show to your community pharmacist(s) and doctor(s) at your next visit.

## 2017-11-21 NOTE — DISCHARGE INSTRUCTIONS
*Get plenty of rest and avoid strenuous activities.  *Take medications as prescribed.  Avoid NSAIDs. Zofran for nausea.  Carafate and omeprazole.   *Follow-up with your doctor for a recheck within 12-36 hours.  Follow-up for endoscopy - a referral has been made for you and you should receive a call.  *Return to the ER if you develop fever, worsening pain, pain that moves to the right lower abdomen, faint or feel like you will faint or become worse in any way.      Gastritis or Ulcer (No Antibiotic Treatment)    Gastritis is irritation and inflammation of the stomach lining. This means the lining is red and swollen. An ulcer is an open sore in the lining of the stomach. It may also occur in the duodenum (first part of the small intestine). The causes and symptoms of gastritis and ulcers are very similar.  Causes and risk factors for both problems can include:    Long-term use of nonsteroidal anti-inflammatory drugs (NSAIDs), such as aspirin and ibuprofen    H. pylori bacteria infection    Tobacco use    Alcohol use  Symptoms for both problems can include:    Dull or burning pain in the upper part of the belly    Loss of appetite    Heartburn or upset stomach    Frequent burping    Bloated feeling    Nausea with or without vomiting  You likely had an evaluation to help determine the exact cause and extent of your problem. This may have included a health history, exam, and certain tests.  Results showed that your problem is not due to H. pylori infection. For this reason, no antibiotics are needed as part of your treatment.  Whether your problem is found to be gastritis or an ulcer, you will still need to take other medicines, however. You will also need to follow instructions to help reduce stomach irritation so your stomach can heal.   Home care    Take any medicines you re prescribed exactly as directed. Common medicines used to treat gastritis include:    Antacids. These help neutralize the normal acids in your  stomach.    Proton pump inhibitors. These block your stomach from making any acid.    H2 blockers.These reduce the amount of acid your stomach makes.    Bismuth subsalicylate. This helps protect the lining of your stomach from acid.    Avoid taking any NSAIDS during your treatment. If you take NSAID to help treat other health problems, tell your healthcare provider. He or she may need to adjust your medicine plan or change the dosage.    Don t use tobacco. Also don t drink alcohol. These products can increase the amount of acid your stomach makes. This can delay healing. It can also worsen symptoms.  Follow-up care  Follow up with your healthcare provider, or as advised. In some cases, further testing may be needed.  When to seek medical advice  Call your healthcare provider right away if any of these occur:    Fever of 100.4 F (38 C) or higher or as directed by your provider    Stomach pain that worsens or moves to the lower right part of abdomen    Extreme fatigue    Weakness or dizziness    Continued weight loss    Frequent vomiting, blood in your vomit, or coffee ground-like substance in your vomit    Black, tarry, or bloody stools  Call 911  Call 911 right away if any of these occur:    Chest pain appears or worsens, or spreads to the back, neck, shoulder, or arm    Unusually fast heart rate    Trouble breathing or swallowing    Confusion    Extreme drowsiness or trouble waking up    Fainting    Large amounts of blood present in vomit or stool  Date Last Reviewed: 6/16/2015 2000-2017 [email or web address]. [address] [place] [address]. All rights reserved. This information is not intended as a substitute for professional medical care. Always follow your healthcare professional's instructions.        Discharge Instructions  Gastrointestinal Bleeding Not Requiring Admission    You have been seen today because of GI (gastrointestinal) bleeding, bleeding somewhere along your digestive tract.   Most common symptoms are blood in the stool or when you have a bowel movement.  The most common causes of minor GI bleeding are ulcers and hemorrhoids.  Other conditions that cause bleeding include abnormal blood vessels in your GI tract, diverticulosis, inflammatory bowel disease, and cancer.  Fortunately, most of the causes of such bleeding are not life-threatening.      It does not appear that your bleeding is serious enough to require admission to the hospital, but it is very important for you to follow up as instructed.    At your follow up, your regular doctor or GI specialist may order further testing such as:    Blood and stool tests.    Endoscopy and/or colonoscopy, where a tube with a camera is used to look at your digestive tract.    Other very specialized tests depending on your symptoms.    Return to the Emergency Department right away if you:    Develop fever with an oral temperature above 101 F.    Vomit blood or something that looks like coffee grounds.    Have a bowel movement that looks like tar or has a large amount of blood in it.    Feel weak, light-headed, or faint.    Have a racing heartbeat.    Have abdominal pain that is new or increasing.    Have new symptoms that worry you.    What can I do to help myself?    Take any acid reducing medication prescribed by your doctor.    Avoid over the counter medications such as aspirin and Advil , Nuprin  (ibuprofen) that can thin your blood or irritate your stomach, making ulcers worse.  If you were given a prescription for medicine here today, be sure to read all of the information (including the package insert) that comes with your prescription.  This will include important information about the medicine, its side effects, and any warnings that you need to know about.  The pharmacist who fills the prescription can provide more information and answer questions you may have about the medicine.  If you have questions or concerns that the pharmacist  cannot address, please call or return to the Emergency Department.     Opioid Medication Information    Pain medications are among the most commonly prescribed medicines, so we are including this information for all our patients. If you did not receive pain medication or get a prescription for pain medicine, you can ignore it.     You may have been given a prescription for an opioid (narcotic) pain medicine and/or have received a pain medicine while here in the Emergency Department. These medicines can make you drowsy or impaired. You must not drive, operate dangerous equipment, or engage in any other dangerous activities while taking these medications. If you drive while taking these medications, you could be arrested for DUI, or driving under the influence. Do not drink any alcohol while you are taking these medications.     Opioid pain medications can cause addiction. If you have a history of chemical dependency of any type, you are at a higher risk of becoming addicted to pain medications.  Only take these prescribed medications to treat your pain when all other options have been tried. Take it for as short a time and as few doses as possible. Store your pain pills in a secure place, as they are frequently stolen and provide a dangerous opportunity for children or visitors in your house to start abusing these powerful medications. We will not replace any lost or stolen medicine.  As soon as your pain is better, you should flush all your remaining medication.     Many prescription pain medications contain Tylenol  (acetaminophen), including Vicodin , Tylenol #3 , Norco , Lortab , and Percocet .  You should not take any extra pills of Tylenol  if you are using these prescription medications or you can get very sick.  Do not ever take more than 3000 mg of acetaminophen in any 24 hour period.    All opioids tend to cause constipation. Drink plenty of water and eat foods that have a lot of fiber, such as fruits,  vegetables, prune juice, apple juice and high fiber cereal.  Take a laxative if you don t move your bowels at least every other day. Miralax , Milk of Magnesia, Colace , or Senna  can be used to keep you regular.      Remember that you can always come back to the Emergency Department if you are not able to see your regular doctor in the amount of time listed above, if you get any new symptoms, or if there is anything that worries you.

## 2017-11-21 NOTE — ED PROVIDER NOTES
History     Chief Complaint:  Abdominal Pain     HPI   Katelyn Mauricio is a 47 year old female who comes today after being seen at urgent care earlier this morning with concern for epigastric pain, lightheadedness, and black stools. She reports that for about a month she's had epigastric pain, and her doctor thought she might have an ulcer; she started her on Prilosec last week. They attributed this to her taking a lot of Ibuprofen when she had a fractured wrist recently. She has not been taking any Ibuprofen in the last month. She has noticed that she has been having intermittent episodes of nausea with lightheadedness. The pain actually improved, but she has some persistent pain that's in the epigastric region. This doesn't seem to be related to eating. There's no right upper quadrant pain. She denies any fevers or similar pain elsewhere. She didn't have any blood work or other testing done at her urgent care office today. She says she's otherwise healthy, and denies other medical problems. She's never had an endoscopy. She still has her gallbladder but had her appendix out.     Allergies:  No known drug allergies    Medications:    Omeprazole (PRILOSEC PO)     Past Medical History:    The patient does not have any past pertinent medical history.    Past Surgical History:    Appendectomy  ENT surgery     Family History:    History reviewed. No pertinent family history.     Social History:  Smoking status: Never smoker   Alcohol use: Yes   Marital Status:   [2]     Review of Systems   Constitutional: Negative for fever.   Gastrointestinal: Positive for abdominal pain, blood in stool and nausea.   Neurological: Positive for light-headedness.   All other systems reviewed and are negative.    Physical Exam     Patient Vitals for the past 24 hrs:   BP Temp Temp src Pulse Resp SpO2 Height Weight   11/21/17 1649 148/90 - - - - - - -   11/21/17 1539 (!) 153/102 - - - - - - -   11/21/17 1449 (!) 161/104 97.9  F (36.6  " C) Oral 89 18 99 % 1.753 m (5' 9\") 75.8 kg (167 lb)     Physical Exam  General: Well-nourished, appears to be resting comfortably when I enter the room  Eyes: PERRL, conjunctivae pink no scleral icterus or conjunctival injection  ENT:  Moist mucus membranes, posterior oropharynx clear without erythema or exudates  Respiratory:  Lungs clear to auscultation bilaterally, no crackles/rubs/wheezes.  Good air movement  CV: Normal rate and rhythm, no murmurs/rubs/gallops  GI:  Abdomen soft and non-distended.  Normoactive BS.  Mild epigastric tenderness, guarding or rebound  Rectal: deferred per patient request  Skin: Warm, dry.  No rashes or petechiae  Musculoskeletal: No peripheral edema or calf tenderness  Neuro: Alert and oriented to person/place/time  Psychiatric: Normal affect      Emergency Department Course     Imaging:  Radiographic findings were communicated with the patient who voiced understanding of the findings.    XR Abdomen 2 Views  IMPRESSION: Flat and upright views of the abdomen. Bowel gas pattern  is unremarkable. No dilated loops of bowel are present to suggest  obstruction. No obvious constipation. Lung bases are clear where  imaged. No free intraperitoneal air. No abnormal calcifications are  evident. IUD projects over the central pelvis. As read by Radiology.    Laboratory:  Lipase: 266  CBC: o/w WNL (WBC 7.5, HGB 12.8, )   CMP: Glucose 126(H), BUN 6(L), o/w WNL (Creatinine 0.74)    Interventions:  1519: NS 1L Bolus IV     Emergency Department Course:  Past medical records, nursing notes, and vitals reviewed.  1445: I performed an exam of the patient and obtained history, as documented above.  IV inserted and blood drawn.    The patient was sent for an abdomen x-ray while in the emergency department, findings above.    1646: I rechecked the patient. Findings and plan explained to the Patient. Patient discharged home with instructions regarding supportive care, medications, and reasons to " return. The importance of close follow-up was reviewed.     Impression & Plan      Medical Decision Making:  Katelyn Mauricio is a 47 year old female who presents with abdominal pain.  A broad differential diagnosis was considered including PUD vs gastritis vs pancreatitis vs perforated viscus vs bilary colic vs obstruction.  Lipase is normal so I doubt pancreatitis.  No RUQ pain or elevated of biliary or liver enzymes so I doubt biliary colic.  Abdominal XR shows no signs of intrabdominal free air or obstruction.  I suspect her symptoms are due to PUD vs gastritis.  I am concerned with her history that she may have an ulcer that is bleeding intermittently.  She is stable here with normal vital signs and a normal hemoglobin though this is slightly lower than expected (even with the margin of lab error) from her previous testing.  I don't feel admission is indicated at this point though I asked her to have a low threshold to return if worsening.  We will avoid NSAIDs, switch to protonix, start carafate and I made a referral for urgent endoscopy.   She was in agreement with the plan and understood reasons to return.     Diagnosis:    ICD-10-CM    1. Abdominal pain, epigastric R10.13 GASTROENTEROLOGY ADULT REF PROCEDURE ONLY   2. Black stools K92.1 GASTROENTEROLOGY ADULT REF PROCEDURE ONLY     Disposition: Patient discharged to home     Discharge Medications:  New Prescriptions    PANTOPRAZOLE (PROTONIX) 40 MG EC TABLET    Take 1 tablet (40 mg) by mouth daily Take 30-60 minutes before a meal.    SUCRALFATE (CARAFATE) 1 GM TABLET    Take 1 tablet (1 g) by mouth 4 times daily     Isabel Ray  11/21/2017    EMERGENCY DEPARTMENT    I, Isabel Ray, am serving as a scribe at 2:45 PM on 11/21/2017 to document services personally performed by Mildred Guerin MD based on my observations and the provider's statements to me.        Mildred Guerin MD  11/21/17 8732

## 2017-11-21 NOTE — ED AVS SNAPSHOT
Emergency Department    [address] [place] [id number]    Phone:  [phone]    Fax:  [phone]                                       Katelyn Mauricio   MRN: [id number]    Department:   Emergency Department   Date of Visit:  11/21/2017           After Visit Summary Signature Page     I have received my discharge instructions, and my questions have been answered. I have discussed any challenges I see with this plan with the nurse or doctor.    ..........................................................................................................................................  Patient/Patient Representative Signature      ..........................................................................................................................................  Patient Representative Print Name and Relationship to Patient    ..................................................               ................................................  Date                                            Time    ..........................................................................................................................................  Reviewed by Signature/Title    ...................................................              ..............................................  Date                                                            Time

## 2018-05-25 ENCOUNTER — HEALTH MAINTENANCE LETTER (OUTPATIENT)
Age: 48
End: 2018-05-25

## 2019-09-27 ENCOUNTER — HEALTH MAINTENANCE LETTER (OUTPATIENT)
Age: 49
End: 2019-09-27

## 2021-01-09 ENCOUNTER — HEALTH MAINTENANCE LETTER (OUTPATIENT)
Age: 51
End: 2021-01-09

## 2021-10-23 ENCOUNTER — HEALTH MAINTENANCE LETTER (OUTPATIENT)
Age: 51
End: 2021-10-23

## 2022-02-12 ENCOUNTER — HEALTH MAINTENANCE LETTER (OUTPATIENT)
Age: 52
End: 2022-02-12

## 2022-10-09 ENCOUNTER — HEALTH MAINTENANCE LETTER (OUTPATIENT)
Age: 52
End: 2022-10-09

## 2023-03-25 ENCOUNTER — HEALTH MAINTENANCE LETTER (OUTPATIENT)
Age: 53
End: 2023-03-25